# Patient Record
Sex: FEMALE | Race: WHITE | Employment: UNEMPLOYED | URBAN - METROPOLITAN AREA
[De-identification: names, ages, dates, MRNs, and addresses within clinical notes are randomized per-mention and may not be internally consistent; named-entity substitution may affect disease eponyms.]

---

## 2020-02-14 ENCOUNTER — HOSPITAL ENCOUNTER (INPATIENT)
Age: 20
LOS: 3 days | Discharge: HOME | DRG: 885 | End: 2020-02-17
Attending: PSYCHIATRY & NEUROLOGY | Admitting: PSYCHIATRY & NEUROLOGY
Payer: SELF-PAY

## 2020-02-14 PROCEDURE — 65220000003 HC RM SEMIPRIVATE PSYCH

## 2020-02-14 RX ORDER — DIPHENHYDRAMINE HYDROCHLORIDE 50 MG/ML
50 INJECTION, SOLUTION INTRAMUSCULAR; INTRAVENOUS
Status: DISCONTINUED | OUTPATIENT
Start: 2020-02-14 | End: 2020-02-17 | Stop reason: HOSPADM

## 2020-02-14 RX ORDER — OLANZAPINE 5 MG/1
5 TABLET ORAL
Status: DISCONTINUED | OUTPATIENT
Start: 2020-02-14 | End: 2020-02-17 | Stop reason: HOSPADM

## 2020-02-14 RX ORDER — BENZTROPINE MESYLATE 1 MG/1
1 TABLET ORAL
Status: DISCONTINUED | OUTPATIENT
Start: 2020-02-14 | End: 2020-02-17 | Stop reason: HOSPADM

## 2020-02-14 RX ORDER — ESCITALOPRAM OXALATE 20 MG/1
20 TABLET ORAL DAILY
COMMUNITY

## 2020-02-14 RX ORDER — LORAZEPAM 2 MG/ML
1 INJECTION INTRAMUSCULAR
Status: DISCONTINUED | OUTPATIENT
Start: 2020-02-14 | End: 2020-02-17 | Stop reason: HOSPADM

## 2020-02-14 RX ORDER — HALOPERIDOL 5 MG/ML
5 INJECTION INTRAMUSCULAR
Status: DISCONTINUED | OUTPATIENT
Start: 2020-02-14 | End: 2020-02-17 | Stop reason: HOSPADM

## 2020-02-14 RX ORDER — TRAZODONE HYDROCHLORIDE 50 MG/1
50 TABLET ORAL
Status: DISCONTINUED | OUTPATIENT
Start: 2020-02-14 | End: 2020-02-17 | Stop reason: HOSPADM

## 2020-02-14 RX ORDER — HYDROXYZINE 50 MG/1
50 TABLET, FILM COATED ORAL
Status: DISCONTINUED | OUTPATIENT
Start: 2020-02-14 | End: 2020-02-17 | Stop reason: HOSPADM

## 2020-02-14 RX ORDER — ADHESIVE BANDAGE
30 BANDAGE TOPICAL DAILY PRN
Status: DISCONTINUED | OUTPATIENT
Start: 2020-02-14 | End: 2020-02-17 | Stop reason: HOSPADM

## 2020-02-14 RX ORDER — ESCITALOPRAM OXALATE 10 MG/1
20 TABLET ORAL DAILY
Status: DISCONTINUED | OUTPATIENT
Start: 2020-02-15 | End: 2020-02-17 | Stop reason: HOSPADM

## 2020-02-14 RX ORDER — ACETAMINOPHEN 325 MG/1
650 TABLET ORAL
Status: DISCONTINUED | OUTPATIENT
Start: 2020-02-14 | End: 2020-02-17 | Stop reason: HOSPADM

## 2020-02-14 NOTE — BH NOTES
TRANSFER - IN REPORT:    Verbal report received from [address], RN on [facility]  being received from [facility] ED for routine progression of care      Report consisted of patients Situation, Background, Assessment and   Recommendations(SBAR). Information from the following report(s) SBAR was reviewed with the receiving nurse. Opportunity for questions and clarification was provided. Assessment completed upon patients arrival to unit and care assumed.

## 2020-02-15 PROBLEM — F32.A DEPRESSIVE DISORDER: Status: ACTIVE | Noted: 2020-02-15

## 2020-02-15 PROBLEM — F41.9 ANXIETY DISORDER: Status: ACTIVE | Noted: 2020-02-15

## 2020-02-15 PROCEDURE — 65220000003 HC RM SEMIPRIVATE PSYCH

## 2020-02-15 PROCEDURE — 74011250637 HC RX REV CODE- 250/637: Performed by: NURSE PRACTITIONER

## 2020-02-15 RX ADMIN — ESCITALOPRAM OXALATE 20 MG: 10 TABLET ORAL at 09:00

## 2020-02-15 NOTE — BH NOTES
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 Allergies   Allergen Reactions    Bee Venom Protein (Honey Bee) Swelling      Social History     Tobacco Use    Smoking status: Not on file   Substance Use Topics    Alcohol use: Not on file      No family history on file. Review of Systems  A comprehensive review of systems was negative except for that written in the HPI. Objective:         Patient Vitals for the past 8 hrs:   BP Temp Pulse Resp SpO2   02/15/20 0856 117/76 98.6 °F (37 °C) 92 20 98 %       Lab/Data Review: All lab results for the last 24 hours reviewed.     Mental Status exam: WNL except for    Sensorium  oriented to time, place and person   Orientation person, place and time/date   Relations cooperative   Eye Contact appropriate   Appearance:  age appropriate   Motor Behavior:  within normal limits   Speech:  non-pressured   Vocabulary average   Thought Process: within normal limits   Thought Content free of delusions   Suicidal ideations no intention   Homicidal ideations no plan  and no intention   Mood:  anxious   Affect:  anxious   Memory recent  adequate   Memory remote:  adequate   Concentration:  adequate   Abstraction:  abstract   Insight:  age appropriate   Reliability good   Judgment:  age appropriate       Assessment/Plan:   Active Problems:    Anxiety disorder (2/15/2020)      Depressive disorder (2/15/2020)            Medications:    Current Facility-Administered Medications   Medication Dose Route Frequency    OLANZapine (ZyPREXA) tablet 5 mg  5 mg Oral Q6H PRN    haloperidol lactate (HALDOL) injection 5 mg  5 mg IntraMUSCular Q6H PRN    benztropine (COGENTIN) tablet 1 mg  1 mg Oral BID PRN    diphenhydrAMINE (BENADRYL) injection 50 mg  50 mg IntraMUSCular BID PRN    hydroxyzine HCL (ATARAX) tablet 50 mg  50 mg Oral TID PRN    LORazepam (ATIVAN) injection 1 mg  1 mg IntraMUSCular Q4H PRN    traZODone (DESYREL) tablet 50 mg  50 mg Oral QHS PRN    acetaminophen (TYLENOL) tablet 650 mg  650 mg Oral Q4H PRN    magnesium hydroxide (MILK OF MAGNESIA) 400 mg/5 mL oral suspension 30 mL  30 mL Oral DAILY PRN    escitalopram oxalate (LEXAPRO) tablet 20 mg  20 mg Oral DAILY       Side Effects:  none    The following information was reviewed and discussed:  the potential medication side effects  none

## 2020-02-15 NOTE — PROGRESS NOTES
Pt receiving continuous q15m safety checks, pt currently asleep resting comfortably in bed. No distress noted, respiratory WNL. Supplemental lighting utilized. Problem: Depressed Mood (Adult/Pediatric)  Goal: *STG: Remains safe in hospital  Outcome: Progressing Towards Goal     Problem: Falls - Risk of  Goal: *Absence of Falls  Description  Document Vish Mcintosh Fall Risk and appropriate interventions in the flowsheet.   Outcome: Progressing Towards Goal  Note: Fall Risk Interventions:

## 2020-02-15 NOTE — BH NOTES
PSYCHOSOCIAL ASSESSMENT  :Patient identifying info:  Felipa Ortiz is a 22 y.o., female admitted 2/14/2020  6:30 PM     Presenting problem and precipitating factors: This is the first in pt psych admission to any [facility]. Pt was sent here form [facility] ER due to depression and anxiety. The main ( 2/13/2020)  trigger was the ending of a six yr relationship with her ex-boyfriend. He called by phone to end it. Pt  became very sad and overwhelmed with her feelings. She did not endorse SI nor HI just feeling very low. Pt had hx /  recently s/s related  anxiety and depression Pt has seen tx via therapy but no medications. Team discussed several medications to mange current s/s of both. Pt agreed to starting medications ( Wellbutrin)     Mental status assessment: Alert, organized thoughts, opened with team in terms of her feelings and reaction to BF ending their relationship. Strengths: Seeking help, goal focus and seemingly able to cope with changes in personal life. Collateral information: Parents     Current psychiatric /substance abuse providers and contact info:Amy Sousa Therapist    Previous psychiatric/substance abuse providers and response to treatment: Yes- therapy    Family history of mental illness or substance abuse: Denied     Substance abuse history:  Denied   Social History     Tobacco Use    Smoking status: Not on file   Substance Use Topics    Alcohol use: Not on file       History of biomedical complications associated with substance abuse :    Patient's current acceptance of treatment or motivation for change: Seeking help to mange s/s related top depression ans anxiety    Family constellation: Parents     Is significant other involved? No, recent broke up     Describe support system: Parents and close friends provide greatest support.  Pt signed SAMIRA / parents     Describe living arrangements and home environment:  Single, no kids and lives in college dorm with roommates  Pt does plan to return.   Health issues:   Hospital Problems  Never Reviewed          Codes Class Noted POA    Anxiety disorder ICD-10-CM: F41.9  ICD-9-CM: 300.00  2/15/2020 Unknown        Depressive disorder ICD-10-CM: F32.9  ICD-9-CM: 091  2/15/2020 Unknown              Trauma history:  Denied     Legal issues: Denied     History of  service: N/A    Financial status: Parents- financial support    Temple/cultural factors: Not voiced     Education/work history: HS grad and currently [employer] She is not employed     Have you been licensed as a health care professional (current or ): No     Leisure and recreation preferences:  Studying     Describe coping skills: Normally good problem solving skills until recent major change / life     Joe Neville  2/15/2020

## 2020-02-15 NOTE — BH NOTES
Pt arrives ambulatory to [facility] from [address] and cooperative with staff. Pt denies current SI/HI/AH/VH and agrees to seek staff if she feels overwhelmed or has thoughts of harming self or others. Skin assessment done with Liane Vieyra RN. Franko score is 23. Skin is intact, multiple tattoos present- both ankles, left thigh, ribcage and inner upper arm, right hip region. Piercing present- multiple each earlobe, nose and tongue. Belongings searched and secured by Kyra Morataya. See chart. Labs done at previous hospital, see chart.

## 2020-02-15 NOTE — PROGRESS NOTES
[facility] 60  Master Treatment Plan for [facility] Treatment Plan Initiated: 2/15/20    Treatment Plan Modalities:  Type of Modality Amount  (x minutes) Frequency (x/week) Duration (x days) Name of Responsible Staff   Community & wrap-up meetings to encourage peer interactions 15 7 1 Eder      Group psychotherapy to assist in building coping skills and internal controls 60 7 1 Corbin Alcaraz   Therapeutic activity groups to build coping skills 60 7 1 Corbin Alcaraz   Psychoeducation in group setting to address:   Medication education   15 7 1 Casandra CHAVEZ   Coping skills         Relaxation techniques         Symptom management         Discharge planning   60 [address] 2 1 150 [facility] 66   60 1 1 volunteer   Recovery/AA/NA      volunteer   Physician medication management   15 7 1 Dr Darien Costa meeting/discharge planning   15 2 [facility]                                 These goals will be met by 2/18/20    Problem: Depressed Mood (Adult/Pediatric)  Goal: *STG: Participates in treatment plan  Outcome: Progressing Towards Goal  Note:   Out on unit engaged and participating. Mood and affect brighter when engaged. Reports sad due to her recent breakup with finance. Denies SI. Daily goal is to talk about her discharge and stay engaged.  Staff focus is on coping skills education  Goal: *STG: Verbalizes anger, guilt, and other feelings in a constructive manor  Outcome: Progressing Towards Goal  Goal: *STG: Attends activities and groups  Outcome: Progressing Towards Goal  Goal: *STG: Demonstrates reduction in symptoms and increase in insight into coping skills/future focused  Outcome: Progressing Towards Goal  Goal: *STG: Complies with medication therapy  Outcome: Progressing Towards Goal  Goal: Interventions  Outcome: Progressing Towards Goal

## 2020-02-15 NOTE — CONSULTS
HISTORY AND PHYSICAL      PCP: UNKNOWN  History source: the patient, outside hospital records      CC: medical evaluation for psychiatric admission      HPI: 22 y.o lady with anxiety and depression who was transferred from [facility] for management of depression and suicidal ideation. She offers no acute medical complaints. She has no known medical problems. PMH/PSH:  Anxiety  Depression    Home meds:   Lexapro    Allergies:  NKA    FH:  Parents have depression    SH:  Denies smoking or alcohol use. Has used marijuana a few times in her life. ROS: A comprehensive review of systems was negative except for that written in the HPI. in addition to: occasional palpitations, every few months      PHYSICAL EXAM:  Visit Vitals  /79   Pulse 89   Temp 98.1 °F (36.7 °C)   Resp 16   SpO2 98%       Gen: NAD, non-toxic  HEENT: anicteric scleraemoist  Neck: supple  Heart: RRR, no MRG  Lungs: CTA b/l, non-labored respirations  Abd: soft, NT, ND  Neuro: CN II-XII grossly intact, normal speech, moves all extremities  Psych: normal mood, appropriate affect        Labs/Imaging:  No results found for this or any previous visit (from the past 24 hour(s)). No results for input(s): WBC, HGB, HCT, PLT, HGBEXT, HCTEXT, PLTEXT in the last 72 hours. No results for input(s): NA, K, CL, CO2, BUN, CREA, GLU, CA, MG, PHOS, URICA in the last 72 hours. No results for input(s): SGOT, GPT, ALT, AP, TBIL, TBILI, TP, ALB, GLOB, GGT, AML, LPSE in the last 72 hours. No lab exists for component: AMYP, HLPSE    No results for input(s): CPK, CKNDX, TROIQ in the last 72 hours. No lab exists for component: CPKMB    No results for input(s): INR, PTP, APTT, INREXT in the last 72 hours. No results for input(s): PH, PCO2, PO2 in the last 72 hours. Labs from [address] [place] [address]:     No acute medical concerns, no chronic medical problems. ROS unremarkable.  Will be available as needed if any medical issues arise. Thank you for involving me in [facility] care.     Signed By: Foster Petty MD     February 14, 2020

## 2020-02-15 NOTE — PROGRESS NOTES
Problem: Depressed Mood (Adult/Pediatric)  Goal: *STG: Participates in treatment plan  Outcome: Progressing Towards Goal  Note:   Pt interacting with peers, participating  Mood/Affect: Laughing, smiling No S/I  Pt goal: Medication education, d/c planning  St. Goal: d/c planning  Goal: *STG: Verbalizes anger, guilt, and other feelings in a constructive manor  Outcome: Progressing Towards Goal  Goal: *STG: Attends activities and groups  Outcome: Progressing Towards Goal  Goal: *STG: Demonstrates reduction in symptoms and increase in insight into coping skills/future focused  Outcome: Progressing Towards Goal  Goal: *STG: Complies with medication therapy  Outcome: Progressing Towards Goal  Goal: Interventions  Outcome: Progressing Towards Goal

## 2020-02-15 NOTE — PROGRESS NOTES
Pt cooperative and calm on unit with peers and staff. She is present on the unit, attending groups and activities and interacting appropriately. Staff to continue q15 minute checks for safety.     Problem: Depressed Mood (Adult/Pediatric)  Goal: *STG: Participates in treatment plan  Outcome: Progressing Towards Goal  Goal: *STG: Attends activities and groups  Outcome: Progressing Towards Goal  Goal: *STG: Remains safe in hospital  Outcome: Progressing Towards Goal

## 2020-02-15 NOTE — INTERDISCIPLINARY ROUNDS
Behavioral Health Interdisciplinary Rounds Patient Name: Felipa Ortiz  Age: 22 y.o. Room/Bed:  746/ Primary Diagnosis: <principal problem not specified> Admission Status: Voluntary Readmission within 30 days: no 
Power of  in place: no 
Patient requires a blocked bed: no          Reason for blocked bed: VTE Prophylaxis: No 
 
Mobility needs/Fall risk: no 
Flu Vaccine : no  
Nutritional Plan:  
Consults:         
Labs/Testing due today?: yes Sleep hours:  7 Participation in Care/Groups:  New admission Medication Compliant?:  
PRNS (last 24 hours): none Restraints (last 24 hours): CIWA (range last 24 hours): COWS (range last 24 hours): Alcohol screening (AUDIT) completed - If applicable, date SBIRT discussed in treatment team AND documented:  
AUDIT Screen Score: Document Brief Intervention (corresponds directly with the 5 A's, Ask, Advise, Assess, Assist, and Arrange): At- Risk Patients (Score 7-15 for women; 8-15 for men) Discuss concern patient is drinking at unhealthy levels known to increase risk of alcohol-related health problems. Is Patient ready to commit to change? If No: 
? Encourage reflection ? Discuss short term and long term health risks of consuming alcohol ? Barriers to change ? Reaffirm willingness to help / Educational materials provided If Yes: 
? Set goal 
? Plan 
? Educational materials provided Harmful use or Dependence (Score 16 or greater) ? Discuss short term and long term health risks of consuming alcohol ? Recommendations ? Negotiate drinking goal 
? Recommend addiction specialist/center ? Arrange follow-up appointments. Tobacco - patient is a smoker: Have You Used Tobacco in the Past 30 Days: Never a smoker Illegal Drugs use: Have You Used Any Illegal Substances Over the Past 12 Months: Yes 
 
24 hour chart check complete: yes Patient goal(s) for today: meet / Tx team to provide information and assist Team / developing goals for treatment Treatment team focus/goals: Complete psych social assessment Progress note Alert, good verbal skills thus able to participate in first Tx team meeting LOS:  1  Expected LOS: Tentative D/C Monday (?) Financial concerns/prescription coverage:   
Family contact:   Jean Gil - Mother [phone] Family requesting physician contact today:  no 
Discharge plan: Return to school Access to weapons : no Outpatient provider(s): Pauline Austin  ( therapist ) @ student services Patient's preferred phone number for follow up call :  
 
Participating treatment team members: Dr Zain Navarro, Saud Grace and Jamey Weller RN

## 2020-02-15 NOTE — PROGRESS NOTES
Problem: Discharge Planning  Goal: *Discharge to safe environment  Outcome: Progressing Towards Goal  Note:   Pt plans to return to live with roommates college dorm  Goal: *Knowledge of medication management  Outcome: Progressing Towards Goal  Note:   Pt. agreed to take meds recommended by [facility] team

## 2020-02-16 LAB
BACTERIA SPEC CULT: NORMAL
BACTERIA SPEC CULT: NORMAL
SERVICE CMNT-IMP: NORMAL

## 2020-02-16 PROCEDURE — 74011250637 HC RX REV CODE- 250/637: Performed by: NURSE PRACTITIONER

## 2020-02-16 PROCEDURE — 65220000003 HC RM SEMIPRIVATE PSYCH

## 2020-02-16 RX ADMIN — ESCITALOPRAM OXALATE 20 MG: 10 TABLET ORAL at 08:55

## 2020-02-16 NOTE — PROGRESS NOTES
02/16/20 0823   Vital Signs   Temp 98.4 °F (36.9 °C)   Temp Source Oral   Pulse (Heart Rate) (!) 127   Heart Rate Source Monitor   Resp Rate 17   O2 Sat (%) 98 %   Level of Consciousness Alert   /76   MAP (Calculated) 92   MEWS Score 3   Height/Weight   Weight 52.9 kg (116 lb 9.6 oz)   BMI (calculated) 21.3   MEWS is 3. Pt anxious at this time. 8:57 am-Pulse is 85. Pt less anxious at this time.

## 2020-02-16 NOTE — PROGRESS NOTES
Received pt in bed resting. Pt appears to be in no distress. Respirations even and unlabored. Continuing to monitor pt with q15 min safety rounds.

## 2020-02-16 NOTE — GROUP NOTE
[facility] GROUP DOCUMENTATION INDIVIDUAL Group Therapy Note Date: 2/16/2020 Group Start Time: 1400 Group End Time: 1500 Group Topic: Social Work Group [address] [place] [address] Kieran Mayes [facility] GROUP DOCUMENTATION GROUP Group Therapy Note Attendees: 9 Attendance: Attended Patient's Goal:   Pt designed a \"Life Motto\" to describe the challenges they have overcome and the goals for the future. Pt will be able to process positive coping mechanisms to better counteract maladaptive behaviors. 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 Pt was able to identify areas of growth in her recovery, and process how to ask for help when she needs it. Fidencio Galvez

## 2020-02-16 NOTE — GROUP NOTE
[facility]  GROUP DOCUMENTATION INDIVIDUAL Group Therapy Note Date: 2/16/2020 Group Start Time: 1500 Group End Time: 1082 Group Topic: Process Group - Inpatient [address] Nelva Buerger [facility] GROUP DOCUMENTATION GROUP Group Therapy Note Attendees: 12 Attendance: Attended Patient's Goal:  Pt focused on positive coping skill, positive personal attributes, and using community involvement to decrease maladaptive coping mechanisms and behaviors. Pt will be able to identify times in their life when their recovery was going well, and ways to continue improving after discharge. Interventions/techniques: Challenged, Informed, Validated and Provide feedback Follows Directions: Followed directions Interactions: Interacted appropriately Mental Status: Calm and Congruent Behavior/appearance: Attentive and Cooperative Goals Achieved: Able to engage in interactions, Able to listen to others, Able to give feedback to another, Able to manage/cope with feelings, Able to receive feedback, Able to self-disclose, Discussed coping, Displayed empathy and Identified feelings Additional Notes:  PT stated she was proud that she is passionate about her future. Pt was able to process that she is proud of her ability to speak multiple languages and enjoy her pre-med classes in college. [place]

## 2020-02-16 NOTE — BH NOTES
GROUP THERAPY PROGRESS NOTE    Tobi Arceo is participating in Reflection.      Group time: 30 minutes    Personal goal for participation:    Goal orientation: community    Group therapy participation: active    Therapeutic interventions reviewed and discussed:     Impression of participation: Pt states ''My day went well and we played games''

## 2020-02-16 NOTE — BH NOTES
Spoke to Charlee French RN Nursing supervisor regarding negative MRSA Nares Swab. Stated need to contact infection control to release pt from contact precaution. 3:10 pm Edson Simpson RN with Infection Control stated to take off MRSA precaution.

## 2020-02-16 NOTE — PROGRESS NOTES
Problem: Depressed Mood (Adult/Pediatric)  Goal: *STG: Participates in treatment plan  Outcome: Progressing Towards Goal  Goal: *STG: Verbalizes anger, guilt, and other feelings in a constructive manor  Outcome: Progressing Towards Goal  Goal: *STG: Attends activities and groups  Outcome: Progressing Towards Goal  Goal: *STG: Demonstrates reduction in symptoms and increase in insight into coping skills/future focused  Outcome: Progressing Towards Goal  Goal: *STG: Complies with medication therapy  Outcome: Progressing Towards Goal  Goal: Interventions  Outcome: Progressing Towards Goal   Pt is engaged on unit  Note no s/sx of increased depression  Pt instructed to verbalized to staff if depression increases

## 2020-02-16 NOTE — PROGRESS NOTES
02/16/20 1129   Vital Signs   Temp 98.6 °F (37 °C)   Temp Source Oral   Pulse (Heart Rate) (!) 112   Heart Rate Source Monitor   Resp Rate 17   O2 Sat (%) 98 %   Level of Consciousness Alert   /84   MAP (Calculated) 95   MEWS Score 3   MEWS 3. Pt anxious at this time. Will increase monitoring of vital signs.

## 2020-02-16 NOTE — BH NOTES
Adult Progress Note    Date: 2/16/2020  Account Number:  [de-identified]  Name: Gini Busby  Diagnosis: MDD recurrent severe no psychosis  Length of session: 15 minutes    Subjective:     Pt bright, talkative, spoke with parents and friends on phone yesterday. Reports good sleep. Denies SI or HI. Patient Active Problem List    Diagnosis Date Noted    Anxiety disorder 02/15/2020    Depressive disorder 02/15/2020     No past surgical history on file. Allergies   Allergen Reactions    Bee Venom Protein (Honey Bee) Swelling      Social History     Tobacco Use    Smoking status: Not on file   Substance Use Topics    Alcohol use: Not on file      No family history on file. Review of Systems  A comprehensive review of systems was negative except for that written in the HPI. Objective:         Patient Vitals for the past 8 hrs:   BP Temp Pulse Resp SpO2 Weight   02/16/20 0852   85      02/16/20 0823 124/76 98.4 °F (36.9 °C) (!) 127 17 98 % 52.9 kg (116 lb 9.6 oz)       Lab/Data Review: All lab results for the last 24 hours reviewed.     Mental Status exam: WNL     Assessment/Plan:   Active Problems:    Anxiety disorder (2/15/2020)      Depressive disorder (2/15/2020)            Medications:    Current Facility-Administered Medications   Medication Dose Route Frequency    OLANZapine (ZyPREXA) tablet 5 mg  5 mg Oral Q6H PRN    haloperidol lactate (HALDOL) injection 5 mg  5 mg IntraMUSCular Q6H PRN    benztropine (COGENTIN) tablet 1 mg  1 mg Oral BID PRN    diphenhydrAMINE (BENADRYL) injection 50 mg  50 mg IntraMUSCular BID PRN    hydroxyzine HCL (ATARAX) tablet 50 mg  50 mg Oral TID PRN    LORazepam (ATIVAN) injection 1 mg  1 mg IntraMUSCular Q4H PRN    traZODone (DESYREL) tablet 50 mg  50 mg Oral QHS PRN    acetaminophen (TYLENOL) tablet 650 mg  650 mg Oral Q4H PRN    magnesium hydroxide (MILK OF MAGNESIA) 400 mg/5 mL oral suspension 30 mL  30 mL Oral DAILY PRN    escitalopram oxalate (LEXAPRO) tablet 20 mg  20 mg Oral DAILY       Side Effects:  none    The following information was reviewed and discussed:  patient given opportunity to ask questions

## 2020-02-16 NOTE — PROGRESS NOTES
Problem: Depressed Mood (Adult/Pediatric)  Goal: *STG: Participates in treatment plan  Outcome: Progressing Towards Goal  Note:   Pt participated in treatment team. Less anxious at this time. Stated she has support from her peers. No concerns at this time. Encouraged pt to attend groups and express feelings and concerns. Goal: Interventions  Outcome: Progressing Towards Goal     Problem: Patient Education: Go to Patient Education Activity  Goal: Patient/Family Education  Outcome: Progressing Towards Goal     Problem: Falls - Risk of  Goal: *Absence of Falls  Description  Document Bishop Morrow Fall Risk and appropriate interventions in the flowsheet.   Outcome: Progressing Towards Goal  Note: Fall Risk Interventions:       Mentation Interventions: Adequate sleep, hydration, pain control    Medication Interventions: Teach patient to arise slowly                   Problem: Patient Education: Go to Patient Education Activity  Goal: Patient/Family Education  Outcome: Progressing Towards Goal

## 2020-02-17 VITALS
BODY MASS INDEX: 21.46 KG/M2 | OXYGEN SATURATION: 97 % | HEIGHT: 62 IN | HEART RATE: 74 BPM | SYSTOLIC BLOOD PRESSURE: 135 MMHG | TEMPERATURE: 98 F | RESPIRATION RATE: 16 BRPM | DIASTOLIC BLOOD PRESSURE: 77 MMHG | WEIGHT: 116.6 LBS

## 2020-02-17 PROCEDURE — 74011250637 HC RX REV CODE- 250/637: Performed by: NURSE PRACTITIONER

## 2020-02-17 RX ADMIN — ESCITALOPRAM OXALATE 20 MG: 10 TABLET ORAL at 08:04

## 2020-02-17 NOTE — DISCHARGE SUMMARY
Some parts of the discharge summary are from the initial Psychiatric interview that was done on admission by the admitting psychiatrist.     Date of Admission: 2/14/2020    Date of Discharge: 2/17/2020     TYPE OF DISCHARGE:   REGULAR -  YES    AMA  RELEASED BY THE TDO COURT    Chief Complaint:   I was feeling depressed. History of Presenting Ilness:  22 y.o lady with anxiety and depression who was transferred from [facility] for management of depression and suicidal ideation. She offers no acute medical complaints. She has no known medical problems. No past medical history on file. Prior to Admission medications    Medication Sig Start Date End Date Taking? Authorizing Provider   escitalopram oxalate (LEXAPRO) 20 mg tablet Take 20 mg by mouth daily. Indications: anxiousness associated with depression   Yes Provider, Historical     Vitals:    02/16/20 2054 02/17/20 0811 02/17/20 1138 02/17/20 1525   BP: 119/82 119/72 129/84 135/77   Pulse: 88 95 83 74   Resp: 16 18 18 16   Temp: 98.2 °F (36.8 °C) 98.3 °F (36.8 °C) 98.3 °F (36.8 °C) 98 °F (36.7 °C)   SpO2:  98% 98% 97%   Weight:       Height:           RADIOLOGY REPORTS:(reviewed/updated 2/19/2020)  No results found.       Mental Status exam: WNL except for    Sensorium  oriented to time, place and person   Orientation person, place and time/date   Relations cooperative   Eye Contact appropriate   Appearance:  age appropriate   Motor Behavior:  within normal limits   Speech:  non-pressured   Vocabulary average   Thought Process: within normal limits   Thought Content free of delusions   Suicidal ideations no intention   Homicidal ideations no plan  and no intention   Mood:  anxious   Affect:  anxious   Memory recent  adequate   Memory remote:  adequate   Concentration:  adequate   Abstraction:  abstract   Insight:  age appropriate   Reliability good   Judgment:  age appropriate         [address]:    Patient was admitted to the inpatient psychiatry unit for acute psychiatric stabilization in regards to symptomatology as described in the HPI above and placed on Q15 minute checks and withdrawal precautions. While on the unit Jessie Napoles was involved in individual, group, occupational and milieu therapy. She was started back on her usual medication regimen as well as PRN medications including Lexapro and Vistaril for anxiety. She improved gradually and was able to integrate into the milieu with help from the nursing staff. Patients symptoms improved gradually including llow moods, anxiety, poor sleep, SI and low energy. She was quite on the unit, appropriate in her interactions, and cooperative with medications and the unit routine. Please see individual progress notes for more specific details regarding patient's hospitalization course. Patient was discharged as per the plan. She had been doing well on the unit as per the report of the nursing staff and my observations. No PRN medication for agitation, seclusion or restraints were required during the last 48 hours of her stay. Jessie Napoles had improved progressively to the point of being stable for discharge and outpatient FU. At this time she did not offer any complaints. Patient denied any SI or HI. Denied any AH or VH. She denied any delusions. Was not considered a danger to self or to others and is safe for discharge. Will FU with her appointments and remains motivated to be in treatment. The patient verbalized understanding of her discharge instructions. DISCHARGE DIAGNOSIS:  Recurrent MDD, severe. MENTAL STATUS EXAM ON DISCHARGE:    General appearance:   Jessie Napoles is a 22 y.o. WHITE OR  female who is well groomed, psychomotor activity is WNL  Eye contact: makes good eye contact  Speech: Spontaneous and coherent  Affect : Euthymic  Mood: \"OK\"  Thought Process: Logical, goal directed  Perception: Denies any AH or VH.    Thought Content: Denies any SI or Plan  Insight: Partial  Judgement: Fair  Cognition: Intact grossly. Current Discharge Medication List      CONTINUE these medications which have NOT CHANGED    Details   escitalopram oxalate (LEXAPRO) 20 mg tablet Take 20 mg by mouth daily. Indications: anxiousness associated with depression              Follow-up Information     Follow up With Specialties Details Why Contact Info    Amy Austin  Go on 2/24/2020 9AM apointment for therapy. You will have a walkin with the NP on duty after your therapy appointment. [place], [place]  Phone: [phone]  Fax: [phone]    New Psychiatric NP  Go on 2/24/2020 You will have a walk-in appointment with the NP on duty following your therapy appointment at [facility] [place]  Phone: [phone]  Fax: [phone]    UNKNOWN            WOUND CARE: none needed. PROGNOSIS:   Good / Fair based on nature of patient's pathology/ies and treatment compliance issues. Prognosis is greatly dependent upon patient's ability to  follow up on psychiatric/psychotherapy appointments as well as to comply with psychiatric medications as prescribed.

## 2020-02-17 NOTE — PROGRESS NOTES
Pharmacist Discharge Medication Reconciliation    Discharging Provider: Dr. Emmie Carlos PMH: No past medical history on file. Chief Complaint for this Admission: No chief complaint on file. Allergies: Bee venom protein (honey bee)    Discharge Medications:   Current Discharge Medication List        CONTINUE these medications which have NOT CHANGED    Details   escitalopram oxalate (LEXAPRO) 20 mg tablet Take 20 mg by mouth daily.  Indications: anxiousness associated with depression           The patient's chart, MAR and AVS were reviewed by [facility] Vianney, PHARMD.

## 2020-02-17 NOTE — PROGRESS NOTES
Problem: Depressed Mood (Adult/Pediatric)  Goal: *STG: Participates in treatment plan  Outcome: Progressing Towards Goal  Note:   Cooperative and participating, mood and affect: hopefull and full range; denies SI; daily goal is to D/C home.  Staff focus; D/C home and follow up care   Goal: *STG: Verbalizes anger, guilt, and other feelings in a constructive manor  Outcome: Progressing Towards Goal  Goal: *STG: Attends activities and groups  Outcome: Progressing Towards Goal  Goal: *STG: Demonstrates reduction in symptoms and increase in insight into coping skills/future focused  Outcome: Progressing Towards Goal  Goal: Interventions  Outcome: Progressing Towards Goal

## 2020-02-17 NOTE — PROGRESS NOTES
Problem: Depressed Mood (Adult/Pediatric)  Goal: *STG: Participates in treatment plan  Outcome: Progressing Towards Goal  Goal: *STG: Verbalizes anger, guilt, and other feelings in a constructive manor  Outcome: Progressing Towards Goal  Goal: *STG: Attends activities and groups  Outcome: Progressing Towards Goal  Goal: *STG: Demonstrates reduction in symptoms and increase in insight into coping skills/future focused  Outcome: Progressing Towards Goal  Pt participates in all therapeutic activities and socializes with peers.

## 2020-02-17 NOTE — BH NOTES
GROUP THERAPY PROGRESS NOTE    Shirly Ormond partially participated in a morning Process Group on the General Unit with a focus identifying feelings, planning for the day, and learning more about DBT concepts on \"Emotion Regulation. \"    .  Group time: 10:16 11:44     Personal goal for participation: To increase the capacity to improve ones mood, set personal goals, and understand more about basic activities to help regulate emotions. Goal orientation: The patients will be able to identify their feelings and develop a plan for structuring   their day. They were also presented with a summary sheet on emotional regulation, in regards to   focusing on one goal per day, taking physical care of oneself, and recognizing and/or building positive   experiences. The didactic portion of the session focused on these three concepts:   1) defining and focusing on one goal per day;   2) taking care of ones physical maintenance and basic needs   sleep, nutrition, and exercise; and   3) finding and building on positive experiences. Therapeutic interventions reviewed and discussed: The group members were asked to identify an   emotion they are having and/or let the group know what they want to focus on for the day as they  continue to make discharge plans. The group members reviewed three DBT suggestions regarding   emotional regulation, in regards to focusing on one goal per day, taking physical care of oneself,  and recognizing and/or building positive experiences. It was suggested that these three concepts can be   seen as headings for their list of coping skills. The group members were also provided worksheets on the   topic discussed for their review and use on their own time. Impression of participation: This patient joined the group a few minutes after the start and actively participated in the group over the rest of the session.  She was alert, generally oriented, and excited about her up-coming discharge later today.  She said she was feeling \"fantastic\" and that she was looking forward to getting back home and to school. She also spoke about how emotionally devastating it was for her to suddenly lose a seven year relationship with her boyfriend. She acknowledged how surprised she was when the boyfriend announced his decision to end the relationship. The patient also shared about devastated and humiliated she felt when she found out a female peer had sent her former boyfriend a clip from a social web site of the patient at a party many years ago. The patient said she was convinced that this social clip was sent to her boyfriend so that this female peer could begin dating her former boyfriend. The patient also acknowledged that she anticipated going to student counseling when she returns to campus. Her affect was non-dysphoric and her mood was mixed with mild euphoria and a consciousness of the importance of focusing on herself and her classes with the help of her faculty advisor and student counseling. The patient expressed no current SI/HI and displayed no overt psychotic symptoms in this group. She appeared to be in the process of finalizing her discharge plans with the help of her treatment team, especially nursing.

## 2020-02-17 NOTE — BH NOTES
Behavioral Health Transition Record to Provider    Patient Name: Deborah Barcenas  YOB: 2000  Medical Record Number: [id number]  Date of Admission: 2/14/2020  Date of Discharge: 2/17/2020    Attending Provider: Jose Rondon MD  Discharging Provider: Martin Farias MD  To contact this individual call [phone] and ask the  to page. If unavailable, ask to be transferred to [address] [place] [address] Provider on call. [facility] Provider will be available on call 24/7 and during holidays. Primary Care Provider: UNKNOWN    Allergies   Allergen Reactions    Bee Venom Protein (Honey Bee) Swelling       Reason for Admission: Patient was voluntarily admitted to [facility] for worsening depression and anxiety. Admission Diagnosis: Depressive disorder [F32.9]  Anxiety disorder [F41.9]    * No surgery found *    Results for orders placed or performed during the hospital encounter of 02/14/20   CULTURE, MRSA   Result Value Ref Range    Special Requests: NO SPECIAL REQUESTS      Culture result: MRSA NOT PRESENT      Culture result:            Screening of patient nares for MRSA is for surveillance purposes and, if positive, to facilitate isolation considerations in high risk settings. It is not intended for automatic decolonization interventions per se as regimens are not sufficiently effective to warrant routine use. Immunizations administered during this encounter: There is no immunization history on file for this patient. Screening for Metabolic Disorders for Patients on Antipsychotic Medications  (Data obtained from the EMR)    Estimated Body Mass Index  Estimated body mass index is 21.33 kg/m² as calculated from the following:    Height as of this encounter: 5' 2\" (1.575 m). Weight as of this encounter: 52.9 kg (116 lb 9.6 oz).      Vital Signs/Blood Pressure  Visit Vitals  /77 (BP 1 Location: Right arm, BP Patient Position: Sitting)   Pulse 74   Temp 98 °F (36.7 °C)   Resp 16   Ht 5' 2\" (1.575 m)   Wt 52.9 kg (116 lb 9.6 oz)   SpO2 97%   BMI 21.33 kg/m²       Blood Glucose/Hemoglobin A1c  No results found for: GLU, GLUCPOC    No results found for: HBA1C, HGBE8, YBD2LMSA     Lipid Panel  No results found for: CHOL, CHOLX, CHLST, CHOLV, 421611, HDL, HDLP, LDL, LDLC, DLDLP, TGLX, TRIGL, TRIGP, CHHD, CHHDX     Discharge Diagnosis: Depressive disorder, Anxiety disorder    Discharge Plan: Patient discharged back to dorm at [facility] via [place] [facility]. The patient Fernanda Better exhibits the ability to control behavior in a less restrictive environment. Patient's level of functioning is improving. No assaultive/destructive behavior has been observed for the past 24 hours. No suicidal/homicidal threat or behavior has been observed for the past 24 hours. There is no evidence of serious medication side effects. Patient has not been in physical or protective restraints for at least the past 24 hours. If weapons involved, how are they secured? NA    Is patient aware of and in agreement with discharge plan? Yes    Arrangements for medication:  Prescriptions given to patient. Copy of discharge instructions to provider?:  [facility] attn: Steven Delarosa ([phone]) attn: [employer] ([phone])    Arrangements for transportation home:  Friend to  . Keep all follow up appointments as scheduled, continue to take prescribed medications per physician instructions. Mental health crisis number:  616 or your local mental health crisis line number at [phone]    Discharge Medication List and Instructions:   Current Discharge Medication List      CONTINUE these medications which have NOT CHANGED    Details   escitalopram oxalate (LEXAPRO) 20 mg tablet Take 20 mg by mouth daily.  Indications: anxiousness associated with depression             Unresulted Labs (24h ago, onward)    None        To obtain results of studies pending at discharge, please contact [phone]    Follow-up Information     Follow up With Specialties Details Why Contact Kishore    Amy Austin  Go on 2/24/2020 9AM apointment for therapy. You will have a walkin with the NP on duty after your therapy appointment. [facility]  Phone: [phone]  Fax: [phone]    New Psychiatric NP  Go on 2/24/2020 You will have a walk-in appointment with the NP on duty following your therapy appointment at [facility] [place]  Phone: [phone]  Fax: [phone]    UNKNOWN              Advanced Directive:   Does the patient have an appointed surrogate decision maker? No  Does the patient have a Medical Advance Directive? No  Does the patient have a Psychiatric Advance Directive? No  If the patient does not have a surrogate or Medical Advance Directive AND Psychiatric Advance Directive, the patient was offered information on these advance directives Patient declined to complete    Patient Instructions: Please continue all medications until otherwise directed by physician. Tobacco Cessation Discharge Plan:   Is the patient a smoker and needs referral for smoking cessation? No  Patient referred to the following for smoking cessation with an appointment? Not applicable     Patient was offered medication to assist with smoking cessation at discharge? Not applicable  Was education for smoking cessation added to the discharge instructions? Yes    Alcohol/Substance Abuse Discharge Plan:   Does the patient have a history of substance/alcohol abuse and requires a referral for treatment? No  Patient referred to the following for substance/alcohol abuse treatment with an appointment? Not applicable  Patient was offered medication to assist with alcohol cessation at discharge? Not applicable  Was education for substance/alcohol abuse added to discharge instructions?  No    Patient discharged to Home; discussed with patient/caregiver and provided to the patient/caregiver either in hard copy or electronically.

## 2020-02-17 NOTE — PROGRESS NOTES
Problem: Depressed Mood (Adult/Pediatric)  Goal: *STG: Demonstrates reduction in symptoms and increase in insight into coping skills/future focused  Outcome: Progressing Towards Goal     Received pt in bed resting. Pt appears to be in no distress. Respirations even and unlabored. Continuing to monitor pt with q15 min safety rounds.

## 2020-02-17 NOTE — BH NOTES
Pt discharged per MD order. All belongings returned. Copy of discharge instructions provided. Pt verbalizes understanding. Per ambulatory off the unit with staff to transportation service.

## 2020-02-17 NOTE — INTERDISCIPLINARY ROUNDS
Behavioral Health Interdisciplinary Rounds Patient Name: Gómez Aguilar  Age: 22 y.o. Room/Bed:  [facility]/ Primary Diagnosis: <principal problem not specified> Admission Status: Voluntary Readmission within 30 days: no 
Power of  in place: no 
Patient requires a blocked bed: no          Reason for blocked bed: VTE Prophylaxis: No 
 
Mobility needs/Fall risk: no 
Flu Vaccine : no  
Nutritional Plan: no 
Consults:         
Labs/Testing due today?: no 
 
Sleep hours: 7 Participation in Care/Groups:  yes Medication Compliant?: Yes PRNS (last 24 hours): None Restraints (last 24 hours):  no 
  
CIWA (range last 24 hours): COWS (range last 24 hours): Alcohol screening (AUDIT) completed -   AUDIT Score: 1 If applicable, date SBIRT discussed in treatment team AND documented:  
AUDIT Screen Score: AUDIT Score: 1 Tobacco - patient is a smoker: Have You Used Tobacco in the Past 30 Days: Never a smoker Illegal Drugs use: Have You Used Any Illegal Substances Over the Past 12 Months: Yes 
 
24 hour chart check complete: yes Patient goal(s) for today: prepare for discharge Treatment team focus/goals: reconcile medications and facilitate discharge Progress note: Pt is alert, oriented, clam, cooperative and psychiatrically stable for discharge. LOS:  3  Expected LOS: 3 Financial concerns/prescription coverage:   
Family contact:     
Family requesting physician contact today:   
Discharge plan: Access to weapons :        
Outpatient provider(s):  
Patient's preferred phone number for follow up call :  
 
Participating treatment team members: Dr. Kurt Alvarez; Elroy Cabrera, RN; Joetta Epley, PharmD; Peterson Guthrie MSW

## 2020-02-17 NOTE — DISCHARGE INSTRUCTIONS
Patient Education        Childhood Depression: Care Instructions  Your Care Instructions  Depression is a mood disorder that causes a child or teen to feel sad or irritable for a long period of time. A young person who is depressed may not enjoy school, play, or friends. He or she may also sleep more or less than usual, lose or gain weight, and be withdrawn. Depression may run in families. It is linked to a chemical problem in the brain. The chemical problem can be caused by medicines, illness, or stress. Events that cause great stress, such as moving or the loss of a loved one, can trigger it. Depression can last for a long time. It may come in cycles of feeling down and feeling normal. It is important to know that all forms of depression can be treated. Follow-up care is a key part of your child's treatment and safety. Be sure to make and go to all appointments, and call your doctor if your child is having problems. It's also a good idea to know your child's test results and keep a list of the medicines your child takes. How can you care for your child at home? · Offer your child support and understanding. This is one of the most important things you can do to help your child cope with being depressed. · Be safe with medicines. Have your child take medicines exactly as prescribed. Call your doctor if your child has any problems with his or her medicine. It is important for your child to keep taking medicine for depression even after symptoms go away, so that it does not come back. Your child may need to try several medicines before finding the one that works best. Many side effects of the medicines go away after a while. Talk to your doctor about any side effects or other concerns. · Make sure your child gets enough sleep. There are things you can do if he or she has problems sleeping. ? Have your child go to bed at the same time every night and get up at the same time every morning. ?  Keep his or her bedroom dark and free of noise. ? Do not let your child drink anything with caffeine after 12:00 noon. ? Do not give your child over-the-counter sleeping pills. They can make his or her sleep restless. They may also interact with depression medicine. · Make sure your child gets regular exercise, such as swimming, walking, or playing vigorously every day. · Avoid over-the-counter medicines, herbal therapies, and any medicines that have not been prescribed by your doctor. They may interfere with the medicine used to treat depression. · Feed your child healthy foods. If your child does not want to eat, it may help to encourage him or her to eat small, frequent snacks rather than 1 or 2 large meals each day. · Encourage your child to be hopeful about feeling better. Positive thinking is very important in treating depression. It is hard to be hopeful when you feel depressed, but remind your child that recovery happens over time. · Find a counselor your child likes and trusts. Encourage your child to talk openly and honestly about his or her problems. · Keep the numbers for these national suicide hotlines: 1-061-750-TALK (9-194.260.5867) and 2-136-KVTLJOA (8-620.659.8036). When should you call for help? Call 911 anytime you think your child may need emergency care. For example, call if:    · Your child makes threats or attempts to hurt himself or herself or another person.     · You are a young person and you feel you cannot stop from hurting yourself or someone else.   [place] your doctor now or seek immediate medical care if:    · Your child hears voices.     · Your child has depression and:  ? Starts to give away his or her possessions. ? Uses illegal drugs or drinks alcohol heavily. ? Talks or writes about death, including writing suicide notes and talking about guns, knives, or pills. Be sure all guns, knives, and pills are safely put away where your child cannot get to them.   ? Starts to spend a lot of time alone. ? Acts very aggressively or suddenly appears calm.    Watch closely for changes in your child's health, and be sure to contact your doctor if your child has any problems. Where can you learn more? Go to http://rhonda-ju.info/. Enter T122 in the search box to learn more about \"Childhood Depression: Care Instructions. \"  Current as of: May 28, 2019  Content Version: 12.2  © 0992-0653 [employer]. Care instructions adapted under license by [employer] (which disclaims liability or warranty for this information). If you have questions about a medical condition or this instruction, always ask your healthcare professional. [place] [address] any warranty or liability for your use of this information. DISCHARGE SUMMARY    Priscilla Marlow  : 2000  MRN: [id number]    The patient Nicole Odonnell exhibits the ability to control behavior in a less restrictive environment. Patient's level of functioning is improving. No assaultive/destructive behavior has been observed for the past 24 hours. No suicidal/homicidal threat or behavior has been observed for the past 24 hours. There is no evidence of serious medication side effects. Patient has not been in physical or protective restraints for at least the past 24 hours. If weapons involved, how are they secured? NA    Is patient aware of and in agreement with discharge plan? Yes    Arrangements for medication:  Prescriptions given to patient. Copy of discharge instructions to provider?:  [address] attn: Susie Headley ([phone]) attn: [facility] [employer] ([phone])    Arrangements for transportation home:  Friend to  . Keep all follow up appointments as scheduled, continue to take prescribed medications per physician instructions.   Mental health crisis number:  [phone] or your local mental health crisis line number at [place] [phone] from Nurse    What to do at Home:  Recommended activity: Activity as tolerated,     If you experience any of the following symptoms: over whelming depression, anxiety, thoughts of harming yourself or others, please follow up with 911 or your local mental [facility] crisis number [phone]. *  Please give a list of your current medications to your Primary Care Provider. *  Please update this list whenever your medications are discontinued, doses are      changed, or new medications (including over-the-counter products) are added. *  Please carry medication information at all times in case of emergency situations. These are general instructions for a healthy lifestyle:    No smoking/ No tobacco products/ Avoid exposure to second hand smoke  Surgeon General's Warning:  Quitting smoking now greatly reduces serious risk to your health. Obesity, smoking, and sedentary lifestyle greatly increases your risk for illness    A healthy diet, regular physical exercise & weight monitoring are important for maintaining a healthy lifestyle    You may be retaining fluid if you have a history of heart failure or if you experience any of the following symptoms:  Weight gain of 3 pounds or more overnight or 5 pounds in a week, increased swelling in our hands or feet or shortness of breath while lying flat in bed. Please call your doctor as soon as you notice any of these symptoms; do not wait until your next office visit. The discharge information has been reviewed with the patient. The patient verbalized understanding. Discharge medications reviewed with the patient and appropriate educational materials and side effects teaching were provided.   ___________________________________________________________________________________________________________________________________